# Patient Record
Sex: FEMALE | URBAN - METROPOLITAN AREA
[De-identification: names, ages, dates, MRNs, and addresses within clinical notes are randomized per-mention and may not be internally consistent; named-entity substitution may affect disease eponyms.]

---

## 2019-10-29 ENCOUNTER — ALLIED HEALTH/NURSE VISIT (OUTPATIENT)
Dept: FAMILY MEDICINE | Facility: CLINIC | Age: 49
End: 2019-10-29

## 2019-10-29 VITALS — DIASTOLIC BLOOD PRESSURE: 85 MMHG | SYSTOLIC BLOOD PRESSURE: 138 MMHG

## 2019-10-29 DIAGNOSIS — Z01.30 BP CHECK: Primary | ICD-10-CM

## 2019-10-29 NOTE — PROGRESS NOTES
Marilynn Tang was evaluated at [facility] on October 29, 2019 at which time her blood pressure was:    BP Readings from Last 3 Encounters:   10/29/19 138/85     Pulse Readings from Last 3 Encounters:   No data found for Pulse       Reviewed lifestyle modifications for blood pressure control and reduction: including making healthy food choices, managing weight, getting regular exercise, smoking cessation, reducing alcohol consumption, monitoring blood pressure regularly.     Symptoms: None    BP Goal:< 140/90 mmHg    BP Assessment:  BP at goal    Potential Reasons for BP too high: NA - Not applicable    BP Follow-Up Plan: Recheck BP in 6 months at pharmacy    Recommendation to Provider: Blood pressure at goal.     Note completed by: Garry Lobo

## 2022-07-12 ENCOUNTER — TRANSFERRED RECORDS (OUTPATIENT)
Dept: HEALTH INFORMATION MANAGEMENT | Facility: CLINIC | Age: 52
End: 2022-07-12